# Patient Record
Sex: FEMALE | URBAN - METROPOLITAN AREA
[De-identification: names, ages, dates, MRNs, and addresses within clinical notes are randomized per-mention and may not be internally consistent; named-entity substitution may affect disease eponyms.]

---

## 2020-11-09 ENCOUNTER — APPOINTMENT (OUTPATIENT)
Age: 60
Setting detail: DERMATOLOGY
End: 2020-11-10

## 2020-11-09 DIAGNOSIS — L64.8 OTHER ANDROGENIC ALOPECIA: ICD-10-CM

## 2020-11-09 PROCEDURE — 99202 OFFICE O/P NEW SF 15 MIN: CPT

## 2020-11-09 PROCEDURE — OTHER ADDITIONAL NOTES: OTHER

## 2020-11-09 PROCEDURE — OTHER COUNSELING: OTHER

## 2020-11-09 NOTE — PROCEDURE: ADDITIONAL NOTES
Additional Notes: Patient has tried black castor oil, takes vitamins and keeps her hair moisturized. Usually parts her hair in the middle. Patient has had “natural” hair for the last 5 years. States in the last 5 years she’s straightened/put chemicals on her hair/wore extensions maybe 5 times. Discussed the use of otc Minoxidil to reduce rate of loss/ maintain hair. Discussed rare results with topical steroids. Discussed the scarring of the hair follicles from things such as heat styling, pulling, etc. Patient had a physical recently. Patient has been anemic her whole life but otherwise, normal lab results.
Detail Level: Simple

## 2020-11-09 NOTE — HPI: HAIR LOSS
How Did The Hair Loss Occur?: gradual in onset
How Severe Is Your Hair Loss?: mild
What Hair Products Do You Use?: Cream of nature
Additional History: Started in 2019 after she had surgery on her toes. States she had hammer toes and the surgery was to straighten them out. Patient states she really noticed her hair falling out after that surgery.